# Patient Record
Sex: FEMALE | Race: WHITE | NOT HISPANIC OR LATINO | Employment: OTHER | URBAN - METROPOLITAN AREA
[De-identification: names, ages, dates, MRNs, and addresses within clinical notes are randomized per-mention and may not be internally consistent; named-entity substitution may affect disease eponyms.]

---

## 2017-10-18 ENCOUNTER — ESTABLISHED COMPREHENSIVE EXAM (OUTPATIENT)
Dept: URBAN - METROPOLITAN AREA CLINIC 43 | Facility: CLINIC | Age: 52
End: 2017-10-18

## 2017-10-18 DIAGNOSIS — H52.203: ICD-10-CM

## 2017-10-18 DIAGNOSIS — H52.03: ICD-10-CM

## 2017-10-18 DIAGNOSIS — H40.033: ICD-10-CM

## 2017-10-18 DIAGNOSIS — H25.093: ICD-10-CM

## 2017-10-18 PROCEDURE — 92132 CPTRZD OPH DX IMG ANT SGM: CPT

## 2017-10-18 PROCEDURE — 92310-2 LEVEL 2 CONTACT LENS MANAGEMENT

## 2017-10-18 PROCEDURE — 92004 COMPRE OPH EXAM NEW PT 1/>: CPT

## 2017-10-18 PROCEDURE — 92015 DETERMINE REFRACTIVE STATE: CPT

## 2017-10-18 ASSESSMENT — VISUAL ACUITY
OS_SC: J12
OS_CC: 20/20-1
OS_SC: 20/50
OD_SC: 20/80-1
OD_SC: J14
OS_CC: J1
OD_CC: 20/20-2
OD_CC: J1

## 2017-10-18 ASSESSMENT — TONOMETRY
OD_IOP_MMHG: 18
OS_IOP_MMHG: 15

## 2017-11-01 ENCOUNTER — SURGERY/PROCEDURE (OUTPATIENT)
Dept: URBAN - METROPOLITAN AREA CLINIC 39 | Facility: CLINIC | Age: 52
End: 2017-11-01

## 2017-11-01 ENCOUNTER — CONSULT (OUTPATIENT)
Dept: URBAN - METROPOLITAN AREA SURGERY 14 | Facility: SURGERY | Age: 52
End: 2017-11-01

## 2017-11-01 ENCOUNTER — SURGERY/PROCEDURE (OUTPATIENT)
Dept: URBAN - METROPOLITAN AREA SURGERY 14 | Facility: SURGERY | Age: 52
End: 2017-11-01

## 2017-11-01 VITALS
RESPIRATION RATE: 14 BRPM | SYSTOLIC BLOOD PRESSURE: 136 MMHG | HEIGHT: 55 IN | HEART RATE: 64 BPM | DIASTOLIC BLOOD PRESSURE: 83 MMHG

## 2017-11-01 DIAGNOSIS — H40.033: ICD-10-CM

## 2017-11-01 DIAGNOSIS — H40.031: ICD-10-CM

## 2017-11-01 DIAGNOSIS — H40.032: ICD-10-CM

## 2017-11-01 PROCEDURE — 92014 COMPRE OPH EXAM EST PT 1/>: CPT

## 2017-11-01 PROCEDURE — 66761 REVISION OF IRIS: CPT

## 2017-11-01 ASSESSMENT — VISUAL ACUITY
OS_GLARE: 20/70
OS_CC: J1
OD_CC: 20/25
OD_CC: J1
OD_SC: J12
OD_GLARE: 20/70
OS_SC: 20/50
OS_CC: 20/25
OD_SC: 20/70
OS_SC: J12

## 2017-11-01 ASSESSMENT — TONOMETRY
OS_IOP_MMHG: 16
OD_IOP_MMHG: 15

## 2017-11-08 ENCOUNTER — SURGERY/PROCEDURE (OUTPATIENT)
Dept: URBAN - METROPOLITAN AREA CLINIC 39 | Facility: CLINIC | Age: 52
End: 2017-11-08

## 2017-11-08 DIAGNOSIS — H40.031: ICD-10-CM

## 2017-11-08 PROCEDURE — 66761 REVISION OF IRIS: CPT

## 2017-11-15 ENCOUNTER — POST-OP (OUTPATIENT)
Dept: URBAN - METROPOLITAN AREA CLINIC 43 | Facility: CLINIC | Age: 52
End: 2017-11-15

## 2017-11-15 DIAGNOSIS — H40.031: ICD-10-CM

## 2017-11-15 PROCEDURE — 99024 POSTOP FOLLOW-UP VISIT: CPT

## 2017-11-15 ASSESSMENT — VISUAL ACUITY
OS_CC: J1
OD_CC: 20/20-1
OS_CC: 20/20
OD_CC: J1

## 2017-11-15 ASSESSMENT — TONOMETRY
OS_IOP_MMHG: 17
OD_IOP_MMHG: 16

## 2017-11-29 ENCOUNTER — CONTACT LENS FOLLOW UP (OUTPATIENT)
Dept: URBAN - METROPOLITAN AREA CLINIC 43 | Facility: CLINIC | Age: 52
End: 2017-11-29

## 2017-11-29 DIAGNOSIS — Z97.3: ICD-10-CM

## 2017-11-29 PROCEDURE — 92310F

## 2017-11-29 ASSESSMENT — VISUAL ACUITY
OD_CC: 20/25-2
OS_SC: J12
OU_CC: J1-
OU_CC: 20/30
OD_CC: J10
OU_SC: J12-
OS_CC: J1
OD_SC: 20/70-1
OS_SC: 20/60-2
OU_SC: 20/40-1
OS_CC: 20/200

## 2018-01-09 ENCOUNTER — CONTACT LENS FOLLOW UP (OUTPATIENT)
Dept: URBAN - METROPOLITAN AREA CLINIC 43 | Facility: CLINIC | Age: 53
End: 2018-01-09

## 2018-01-09 DIAGNOSIS — Z97.3: ICD-10-CM

## 2018-01-09 PROCEDURE — 92310F

## 2018-01-09 ASSESSMENT — VISUAL ACUITY
OD_CC: 20/25
OD_CC: J12
OS_CC: J2
OS_CC: 20/400

## 2018-02-27 ENCOUNTER — CONTACT LENS FOLLOW UP (OUTPATIENT)
Dept: URBAN - METROPOLITAN AREA CLINIC 43 | Facility: CLINIC | Age: 53
End: 2018-02-27

## 2018-02-27 DIAGNOSIS — Z97.3: ICD-10-CM

## 2018-02-27 PROCEDURE — 92310F

## 2018-02-27 ASSESSMENT — VISUAL ACUITY
OS_CC: 20/30-2
OD_CC: 20/30-2
OD_CC: J8
OS_CC: J10-

## 2018-04-09 ENCOUNTER — RX CHANGE - NO APPT (OUTPATIENT)
Dept: URBAN - METROPOLITAN AREA CLINIC 43 | Facility: CLINIC | Age: 53
End: 2018-04-09

## 2018-04-09 DIAGNOSIS — Z97.3: ICD-10-CM

## 2018-04-09 PROCEDURE — G8785 BP SCRN NO PERF AT INTERVAL: HCPCS

## 2018-04-09 PROCEDURE — 4040F PNEUMOC VAC/ADMIN/RCVD: CPT

## 2018-04-09 PROCEDURE — G8428 CUR MEDS NOT DOCUMENT: HCPCS

## 2018-04-09 PROCEDURE — 1036F TOBACCO NON-USER: CPT

## 2019-02-06 ENCOUNTER — ESTABLISHED COMPREHENSIVE EXAM (OUTPATIENT)
Dept: URBAN - METROPOLITAN AREA CLINIC 43 | Facility: CLINIC | Age: 54
End: 2019-02-06

## 2019-02-06 DIAGNOSIS — H52.203: ICD-10-CM

## 2019-02-06 DIAGNOSIS — H52.03: ICD-10-CM

## 2019-02-06 PROCEDURE — 92015 DETERMINE REFRACTIVE STATE: CPT

## 2019-02-06 PROCEDURE — 92014 COMPRE OPH EXAM EST PT 1/>: CPT

## 2019-02-06 ASSESSMENT — VISUAL ACUITY
OD_SC: 20/80
OS_CC: J10
OD_CC: J10
OS_SC: 20/80-1
OD_CC: 20/30-1
OS_CC: 20/30+2

## 2019-02-06 ASSESSMENT — TONOMETRY
OD_IOP_MMHG: 16
OS_IOP_MMHG: 15

## 2019-05-29 ENCOUNTER — EST. PATIENT EMERGENCY (OUTPATIENT)
Dept: URBAN - METROPOLITAN AREA CLINIC 43 | Facility: CLINIC | Age: 54
End: 2019-05-29

## 2019-05-29 DIAGNOSIS — H01.005: ICD-10-CM

## 2019-05-29 DIAGNOSIS — H04.123: ICD-10-CM

## 2019-05-29 DIAGNOSIS — H01.002: ICD-10-CM

## 2019-05-29 PROCEDURE — 92012 INTRM OPH EXAM EST PATIENT: CPT

## 2019-05-29 ASSESSMENT — VISUAL ACUITY
OD_SC: 20/70-1
OS_PH: 20/25-1
OS_SC: 20/60-1
OD_PH: 20/30-1

## 2019-10-08 NOTE — PATIENT DISCUSSION
Reviewed OPTIONS including AVASTIN/EYLEA/LUCENTIS and patient wants to proceed with LUCENTIS treatment AND REPEAT EVERY 29 DAYS X 3.

## 2019-10-08 NOTE — PROCEDURE NOTE: CLINICAL
PROCEDURE NOTE: Lucentis 0.5mg PFS OD. Diagnosis: Neovascular AMD with Active CNV. Anesthesia: Lidocaine. Prep: Betadine Flush. Prior to injection, risks/benefits/alternatives discussed including but not limited to infection, loss of vision or eye, hemorrhage, cataract, glaucoma, retinal tears or detachment. The patient wished to proceed with treatment. Topical anesthesia was induced with Alcaine. Additional anesthesia was achieved using drop(s) or injection checked above. A drop of Povidone-iodine 5% ophthalmic solution was instilled over the injection site and in the inferior fornix. Betadine prep was performed. A single use prefilled syringe of intravitreal Lucentis 0.5mg/0.05ml was used and excess discarded. The needle was passed 3.0 mm posterior to the limbus in pseudophakic patients, and 3.5 mm posterior to the limbus in phakic patients. The remainder of the Lucentis 0.5mg in the single-use vial was then discarded in a medical waste disposal container. The eye was irrigated with sterile irrigating solution. Patient tolerated the procedure well. There were no complications. Post procedure instructions given. CF vision checked. Injection Time 4:08PM. The patient was instructed to return for re-evaluation in approximately 4-12 weeks depending on his/her condition and was told to call immediately if vision decreases and/or if his/her eye becomes red, painful, and/or light sensitive. The patient was instructed to go to the emergency room or call 911 if unable to reach the doctor within an hour or two of trying or calling. Trini Dolan

## 2019-10-08 NOTE — PATIENT DISCUSSION
PT UNDERSTANDS [place] PREFERS AVASTIN BUT PT WANTS TO GO WITH FDA APPROVED MED.  CLASS ACTION LAWSUIT ON AVASTIN IS ALSO A CONCER.

## 2019-11-12 NOTE — PROCEDURE NOTE: CLINICAL
PROCEDURE NOTE: Lucentis 0.5mg PFS OD. Diagnosis: Neovascular AMD with Active CNV. Anesthesia: Lidocaine. Prep: Betadine Flush. Prior to injection, risks/benefits/alternatives discussed including but not limited to infection, loss of vision or eye, hemorrhage, cataract, glaucoma, retinal tears or detachment. The patient wished to proceed with treatment. Topical anesthesia was induced with Alcaine. Additional anesthesia was achieved using drop(s) or injection checked above. A drop of Povidone-iodine 5% ophthalmic solution was instilled over the injection site and in the inferior fornix. Betadine prep was performed. A single use prefilled syringe of intravitreal Lucentis 0.5mg/0.05ml was used and excess discarded. The needle was passed 3.0 mm posterior to the limbus in pseudophakic patients, and 3.5 mm posterior to the limbus in phakic patients. The remainder of the Lucentis 0.5mg in the single-use vial was then discarded in a medical waste disposal container. The eye was irrigated with sterile irrigating solution. Patient tolerated the procedure well. There were no complications. Post procedure instructions given. CF vision checked. Injection Time 9:39AM. The patient was instructed to return for re-evaluation in approximately 4-12 weeks depending on his/her condition and was told to call immediately if vision decreases and/or if his/her eye becomes red, painful, and/or light sensitive. The patient was instructed to go to the emergency room or call 911 if unable to reach the doctor within an hour or two of trying or calling. Bety Nance

## 2019-11-12 NOTE — PATIENT DISCUSSION
PT UNDERSTANDS [place] PREFERS AVASTIN BUT PT WANTS TO GO WITH FDA APPROVED MED.  CLASS ACTION LAWSUIT ON AVASTIN IS ALSO A CONCER.

## 2019-12-17 NOTE — PATIENT DISCUSSION
Reviewed OPTIONS including AVASTIN/EYLEA/LUCENTIS and patient wants to proceed with LUCENTIS treatment AND REPEAT EVERY 29 DAYS X 3. Private car

## 2019-12-17 NOTE — PATIENT DISCUSSION
PT UNDERSTANDS [place] PREFERS AVASTIN BUT PT WANTS TO GO WITH FDA APPROVED MED.  CLASS ACTION LAWSUIT ON AVASTIN IS ALSO A CONCER.

## 2019-12-17 NOTE — PROCEDURE NOTE: CLINICAL
PROCEDURE NOTE: Lucentis 0.5mg PFS OD. Diagnosis: Neovascular AMD with Active CNV. Anesthesia: Topical. Prep: Betadine Flush. Prior to injection, risks/benefits/alternatives discussed including but not limited to infection, loss of vision or eye, hemorrhage, cataract, glaucoma, retinal tears or detachment. The patient wished to proceed with treatment. Topical anesthesia was induced with Alcaine. Additional anesthesia was achieved using drop(s) or injection checked above. A drop of Povidone-iodine 5% ophthalmic solution was instilled over the injection site and in the inferior fornix. Betadine prep was performed. A single use prefilled syringe of intravitreal Lucentis 0.5mg/0.05ml was used and excess discarded. The needle was passed 3.0 mm posterior to the limbus in pseudophakic patients, and 3.5 mm posterior to the limbus in phakic patients. The remainder of the Lucentis 0.5mg in the single-use vial was then discarded in a medical waste disposal container. The eye was irrigated with sterile irrigating solution. Patient tolerated the procedure well. There were no complications. Post procedure instructions given. CF vision checked. Injection Time 9:44AM. The patient was instructed to return for re-evaluation in approximately 4-12 weeks depending on his/her condition and was told to call immediately if vision decreases and/or if his/her eye becomes red, painful, and/or light sensitive. The patient was instructed to go to the emergency room or call 911 if unable to reach the doctor within an hour or two of trying or calling. Ni Resendiz

## 2020-01-21 NOTE — PROCEDURE NOTE: CLINICAL

## 2020-01-21 NOTE — PATIENT DISCUSSION
PT UNDERSTANDS [place] PREFERS AVASTIN BUT PT WANTS TO GO WITH FDA APPROVED MED.  CLASS ACTION LAWSUIT ON AVASTIN IS ALSO A CONCER.

## 2020-02-07 ENCOUNTER — ESTABLISHED COMPREHENSIVE EXAM (OUTPATIENT)
Dept: URBAN - METROPOLITAN AREA CLINIC 43 | Facility: CLINIC | Age: 55
End: 2020-02-07

## 2020-02-07 DIAGNOSIS — H52.03: ICD-10-CM

## 2020-02-07 DIAGNOSIS — H52.203: ICD-10-CM

## 2020-02-07 PROCEDURE — 92015 DETERMINE REFRACTIVE STATE: CPT

## 2020-02-07 PROCEDURE — 92014 COMPRE OPH EXAM EST PT 1/>: CPT

## 2020-02-07 ASSESSMENT — TONOMETRY
OS_IOP_MMHG: 16
OD_IOP_MMHG: 16

## 2020-02-07 ASSESSMENT — VISUAL ACUITY
OD_CC: J8-
OD_SC: J14
OD_SC: 20/60-1
OD_CC: 20/20-3
OS_SC: 20/60-1
OS_CC: J8-
OS_SC: J12
OS_CC: 20/20-1

## 2020-02-25 NOTE — PATIENT DISCUSSION
PT UNDERSTANDS [place] PREFERS AVASTIN BUT PT WANTS TO GO WITH FDA APPROVED MED.  CLASS ACTION LAWSUIT ON AVASTIN IS ALSO A CONCER.

## 2020-03-11 NOTE — PATIENT DISCUSSION
PT UNDERSTANDS [place] PREFERS AVASTIN BUT PT WANTS TO GO WITH FDA APPROVED MED.  CLASS ACTION LAWSUIT ON AVASTIN IS ALSO A CONCER.

## 2020-03-11 NOTE — PROCEDURE NOTE: CLINICAL

## 2020-03-16 ENCOUNTER — EST. PATIENT EMERGENCY (OUTPATIENT)
Dept: URBAN - METROPOLITAN AREA CLINIC 43 | Facility: CLINIC | Age: 55
End: 2020-03-16

## 2020-03-16 DIAGNOSIS — H04.121: ICD-10-CM

## 2020-03-16 DIAGNOSIS — H00.022: ICD-10-CM

## 2020-03-16 PROCEDURE — 92012 INTRM OPH EXAM EST PATIENT: CPT

## 2020-03-16 RX ORDER — DOXYCYCLINE 100 MG/1: 1 CAPSULE ORAL ONCE A DAY

## 2020-03-16 ASSESSMENT — TONOMETRY
OS_IOP_MMHG: 16
OD_IOP_MMHG: 16

## 2020-03-16 ASSESSMENT — VISUAL ACUITY
OD_CC: 20/50
OS_CC: 20/25+2

## 2020-04-22 NOTE — PROCEDURE NOTE: CLINICAL
PROCEDURE NOTE: Lucentis 0.5mg PFS OD. Diagnosis: Neovascular AMD with Active CNV. Prior to injection, risks/benefits/alternatives discussed including but not limited to infection, loss of vision or eye, hemorrhage, cataract, glaucoma, retinal tears or detachment. The patient wished to proceed with treatment. Topical anesthesia was induced with Alcaine. Additional anesthesia was achieved using drop(s) or injection checked above. A drop of Povidone-iodine 5% ophthalmic solution was instilled over the injection site and in the inferior fornix. Betadine prep was performed. A single use prefilled syringe of intravitreal Lucentis 0.5mg/0.05ml was used and excess was disposed of as waste. The needle was passed 3.0 mm posterior to the limbus in pseudophakic patients, and 3.5 mm posterior to the limbus in phakic patients. Injection Time 8:45AM. Patient tolerated the procedure well. There were no complications. The eye was irrigated with sterile irrigating solution. Post procedure instructions given. The patient was instructed to use Artificial Tears q.i.d. p.r.n for comfort. The patient was instructed to return for re-evaluation in approximately 4-12 weeks depending on his/her condition and was told to call immediately if vision decreases and/or if his/her eye becomes red, painful, and/or light sensitive. The patient was instructed to go to the emergency room or call 911 if unable to reach the doctor within an hour or two of trying or calling. Bettie Alford

## 2020-04-22 NOTE — PATIENT DISCUSSION
PT UNDERSTANDS [place] PREFERS AVASTIN BUT PT WANTS TO GO WITH FDA APPROVED MED.  CLASS ACTION LAWSUIT ON AVASTIN IS ALSO A CONCER.

## 2020-05-27 NOTE — PATIENT DISCUSSION
5 27 20 MILD ^ SRF AT 5 WKS - TO REDUCE F/U TO 29 DAYS - IF NO IMPROVEMENT CONSIDER SWITCHING TO EYLEA.

## 2020-05-27 NOTE — PATIENT DISCUSSION
PT UNDERSTANDS [place] PREFERS AVASTIN BUT PT WANTS TO GO WITH FDA APPROVED MED.  CLASS ACTION LAWSUIT ON AVASTIN IS ALSO A CONCER.

## 2020-05-27 NOTE — PROCEDURE NOTE: CLINICAL
PROCEDURE NOTE: Lucentis 0.5mg PFS OD. Diagnosis: Neovascular AMD with Active CNV. Prior to injection, risks/benefits/alternatives discussed including but not limited to infection, loss of vision or eye, hemorrhage, cataract, glaucoma, retinal tears or detachment. The patient wished to proceed with treatment. Topical anesthesia was induced with Alcaine. Additional anesthesia was achieved using drop(s) or injection checked above. A drop of Povidone-iodine 5% ophthalmic solution was instilled over the injection site and in the inferior fornix. Betadine prep was performed. A single use prefilled syringe of intravitreal Lucentis 0.5mg/0.05ml was used and excess was disposed of as waste. The needle was passed 3.0 mm posterior to the limbus in pseudophakic patients, and 3.5 mm posterior to the limbus in phakic patients. Injection Time *. Patient tolerated the procedure well. There were no complications. The eye was irrigated with sterile irrigating solution. Post procedure instructions given. The patient was instructed to use Artificial Tears q.i.d. p.r.n for comfort. The patient was instructed to return for re-evaluation in approximately 4-12 weeks depending on his/her condition and was told to call immediately if vision decreases and/or if his/her eye becomes red, painful, and/or light sensitive. The patient was instructed to go to the emergency room or call 911 if unable to reach the doctor within an hour or two of trying or calling. Akbar Salter

## 2020-07-07 NOTE — PATIENT DISCUSSION
PT UNDERSTANDS [place] PREFERS AVASTIN BUT PT WANTS TO GO WITH FDA APPROVED MED.  CLASS ACTION LAWSUIT ON AVASTIN IS ALSO A CONCER.

## 2020-07-07 NOTE — PROCEDURE NOTE: CLINICAL
PROCEDURE NOTE: Eylea PFS OD. Diagnosis: Neovascular AMD with Active CNV. Prior to injection, risks/benefits/alternatives discussed including but not limited to infection, loss of vision or eye, hemorrhage, cataract, glaucoma, retinal tears or detachment. The patient wished to proceed with treatment. Betadine prep was performed. Topical anesthesia was induced with Alcaine. Additional anesthesia was achieved using drop(s) or injection checked above. A drop of Povidone-iodine 5% ophthalmic solution was instilled over the injection site and in the inferior fornix. A single use prefilled syringe of intravitreal Eylea 2mg/0.05ml was used and excess was disposed of as waste. The needle was passed 3.0 mm posterior to the limbus in pseudophakic patients, and 3.5 mm posterior to the limbus in phakic patients. Injection time: 9:39AM.  Patient tolerated procedure well. There were no complications. The eye was irrigated with sterile irrigating solution. Post procedure instructions given. The patient was instructed to use Artificial Tears q.i.d. p.r.n for comfort. The patient was instructed to return for re-evaluation in approximately 4-12 weeks depending on his/her condition and was told to call immediately if vision decreases and/or if his/her eye becomes red, painful, and/or light sensitive. The patient was instructed to go to the emergency room or call 911 if unable to reach the doctor within an hour or two of trying or calling. Cailin Odom

## 2020-08-05 NOTE — PROCEDURE NOTE: CLINICAL

## 2020-08-05 NOTE — PATIENT DISCUSSION
PT UNDERSTANDS [place] PREFERS AVASTIN BUT PT WANTS TO GO WITH FDA APPROVED MED.  CLASS ACTION LAWSUIT ON AVASTIN IS ALSO A CONCER.

## 2020-08-05 NOTE — PATIENT DISCUSSION
EYLEA  AND REPEAT EVERY 29 DAYS X 4 DOI - TRACE SRF/EDEMA AT 29 DAYS - SIG BETTER THAN AT 5 WKS ON L 5 27 20 - RETX AND KEEP 29 DAYS - IF CONTINUES TO DO WELL TO CONSIDER EXTENDING AFTER THIS CYCLE OF TXS.

## 2020-08-11 ENCOUNTER — EST. PATIENT EMERGENCY (OUTPATIENT)
Dept: URBAN - METROPOLITAN AREA CLINIC 43 | Facility: CLINIC | Age: 55
End: 2020-08-11

## 2020-08-11 DIAGNOSIS — H00.022: ICD-10-CM

## 2020-08-11 DIAGNOSIS — H04.121: ICD-10-CM

## 2020-08-11 PROCEDURE — 92012 INTRM OPH EXAM EST PATIENT: CPT

## 2020-08-11 RX ORDER — DOXYCYCLINE 50 MG/1
1 CAPSULE ORAL ONCE A DAY
Start: 2020-08-11 | End: 2020-09-11

## 2020-08-11 ASSESSMENT — VISUAL ACUITY
OD_CC: 20/25
OS_CC: 20/25

## 2020-09-03 NOTE — PROCEDURE NOTE: CLINICAL

## 2020-09-03 NOTE — PATIENT DISCUSSION
PT UNDERSTANDS [place] PREFERS AVASTIN BUT PT WANTS TO GO WITH FDA APPROVED MED.  CLASS ACTION LAWSUIT ON AVASTIN IS ALSO A CONCER.

## 2020-10-05 NOTE — PROCEDURE NOTE: CLINICAL
PROCEDURE NOTE: Eylea PFS OD. Diagnosis: Neovascular AMD with Active CNV. Prior to injection, risks/benefits/alternatives discussed including but not limited to infection, loss of vision or eye, hemorrhage, cataract, glaucoma, retinal tears or detachment. The patient wished to proceed with treatment. Betadine prep was performed. Topical anesthesia was induced with Alcaine. Additional anesthesia was achieved using drop(s) or injection checked above. A drop of Povidone-iodine 5% ophthalmic solution was instilled over the injection site and in the inferior fornix. A single use prefilled syringe of intravitreal Eylea 2mg/0.05ml was used and excess was disposed of as waste. The needle was passed 3.0 mm posterior to the limbus in pseudophakic patients, and 3.5 mm posterior to the limbus in phakic patients. Injection time: 3:00 P. M. Patient tolerated procedure well. There were no complications. The eye was irrigated with sterile irrigating solution. Post procedure instructions given. The patient was instructed to use Artificial Tears q.i.d. p.r.n for comfort. The patient was instructed to return for re-evaluation in approximately 4-12 weeks depending on his/her condition and was told to call immediately if vision decreases and/or if his/her eye becomes red, painful, and/or light sensitive. The patient was instructed to go to the emergency room or call 911 if unable to reach the doctor within an hour or two of trying or calling. Dylon Marinelli

## 2020-10-05 NOTE — PATIENT DISCUSSION
8 5 20 EYLEA  AND REPEAT EVERY 29 DAYS X 4 DOI - TRACE SRF/EDEMA AT 29 DAYS - SIG BETTER THAN AT 5 WKS ON L 5 27 20 - RETX AND KEEP 29 DAYS - IF CONTINUES TO DO WELL TO CONSIDER EXTENDING AFTER THIS CYCLE OF TXS.

## 2020-10-05 NOTE — PATIENT DISCUSSION
PT UNDERSTANDS [place] PREFERS AVASTIN BUT PT WANTS TO GO WITH FDA APPROVED MED.  CLASS ACTION LAWSUIT ON AVASTIN IS ALSO A CONCER.

## 2020-10-21 ENCOUNTER — EST. PATIENT EMERGENCY (OUTPATIENT)
Dept: URBAN - METROPOLITAN AREA CLINIC 43 | Facility: CLINIC | Age: 55
End: 2020-10-21

## 2020-10-21 DIAGNOSIS — H00.15: ICD-10-CM

## 2020-10-21 DIAGNOSIS — H00.12: ICD-10-CM

## 2020-10-21 DIAGNOSIS — L98.0: ICD-10-CM

## 2020-10-21 DIAGNOSIS — H04.121: ICD-10-CM

## 2020-10-21 PROCEDURE — 99212 OFFICE O/P EST SF 10 MIN: CPT

## 2020-10-21 ASSESSMENT — VISUAL ACUITY
OS_CC: 20/30+2
OD_CC: 20/30-1

## 2020-10-26 ENCOUNTER — ESTABLISHED PATIENT (OUTPATIENT)
Dept: URBAN - METROPOLITAN AREA CLINIC 44 | Facility: CLINIC | Age: 55
End: 2020-10-26

## 2020-10-26 DIAGNOSIS — H00.12: ICD-10-CM

## 2020-10-26 DIAGNOSIS — H00.15: ICD-10-CM

## 2020-10-26 DIAGNOSIS — L98.0: ICD-10-CM

## 2020-10-26 PROCEDURE — 92285 EXTERNAL OCULAR PHOTOGRAPHY: CPT

## 2020-10-26 PROCEDURE — 67801 REMOVE EYELID LESIONS: CPT

## 2020-10-26 PROCEDURE — 99212 OFFICE O/P EST SF 10 MIN: CPT

## 2020-10-26 RX ORDER — TOBRAMYCIN AND DEXAMETHASONE 1; 3 MG/ML; MG/ML
1 SUSPENSION/ DROPS OPHTHALMIC
End: 2020-11-09

## 2020-10-26 ASSESSMENT — VISUAL ACUITY
OS_CC: 20/30+2
OD_CC: 20/30-1

## 2020-11-10 NOTE — PATIENT DISCUSSION
PT UNDERSTANDS [place] PREFERS AVASTIN BUT PT WANTS TO GO WITH FDA APPROVED MED.  CLASS ACTION LAWSUIT ON AVASTIN IS ALSO A CONCER.

## 2020-11-10 NOTE — PROCEDURE NOTE: CLINICAL

## 2020-12-15 NOTE — PROCEDURE NOTE: CLINICAL
PROCEDURE NOTE: Eylea PFS OD. Diagnosis: Neovascular AMD with Active CNV. Anesthesia: Topical. Prep: Betadine Drops. Prior to injection, risks/benefits/alternatives discussed including but not limited to infection, loss of vision or eye, hemorrhage, cataract, glaucoma, retinal tears or detachment. The patient wished to proceed with treatment. Betadine prep was performed. Topical anesthesia was induced with Alcaine. Additional anesthesia was achieved using drop(s) or injection checked above. A drop of Povidone-iodine 5% ophthalmic solution was instilled over the injection site and in the inferior fornix. A single use prefilled syringe of intravitreal Eylea 2mg/0.05ml was used and excess was disposed of as waste. The needle was passed 3.0 mm posterior to the limbus in pseudophakic patients, and 3.5 mm posterior to the limbus in phakic patients. Injection time: 1000AM.  Patient tolerated procedure well. There were no complications. The eye was irrigated with sterile irrigating solution. Post procedure instructions given. The patient was instructed to use Artificial Tears q.i.d. p.r.n for comfort. The patient was instructed to return for re-evaluation in approximately 4-12 weeks depending on his/her condition and was told to call immediately if vision decreases and/or if his/her eye becomes red, painful, and/or light sensitive. The patient was instructed to go to the emergency room or call 911 if unable to reach the doctor within an hour or two of trying or calling. Reid Rowell

## 2020-12-15 NOTE — PATIENT DISCUSSION
PT UNDERSTANDS [place] PREFERS AVASTIN BUT PT WANTS TO GO WITH FDA APPROVED MED.  CLASS ACTION LAWSUIT ON AVASTIN IS ALSO A CONCER.

## 2021-01-20 NOTE — PATIENT DISCUSSION
PT UNDERSTANDS [place] PREFERS AVASTIN BUT PT WANTS TO GO WITH FDA APPROVED MED.  CLASS ACTION LAWSUIT ON AVASTIN IS ALSO A CONCER.

## 2021-01-20 NOTE — PATIENT DISCUSSION
1 20 21 TO EXTEND TO 6 WKS AND REPEAT EVERY 6 WKS X 3 DOI- TRACE EDEMA AT 5 WKS ON EYLEA - TX AND EXTEND.

## 2021-01-20 NOTE — PROCEDURE NOTE: CLINICAL
PROCEDURE NOTE: Eylea PFS OD. Diagnosis: Neovascular AMD with Active CNV. Anesthesia: Topical. Prep: Betadine Drops. Prior to injection, risks/benefits/alternatives discussed including but not limited to infection, loss of vision or eye, hemorrhage, cataract, glaucoma, retinal tears or detachment. The patient wished to proceed with treatment. Betadine prep was performed. Topical anesthesia was induced with Alcaine. Additional anesthesia was achieved using drop(s) or injection checked above. A drop of Povidone-iodine 5% ophthalmic solution was instilled over the injection site and in the inferior fornix. A single use prefilled syringe of intravitreal Eylea 2mg/0.05ml was used and excess was disposed of as waste. The needle was passed 3.0 mm posterior to the limbus in pseudophakic patients, and 3.5 mm posterior to the limbus in phakic patients. Injection time: 10:00 a.m. Patient tolerated procedure well. There were no complications. The eye was irrigated with sterile irrigating solution. Post procedure instructions given. The patient was instructed to use Artificial Tears q.i.d. p.r.n for comfort. The patient was instructed to return for re-evaluation in approximately 4-12 weeks depending on his/her condition and was told to call immediately if vision decreases and/or if his/her eye becomes red, painful, and/or light sensitive. The patient was instructed to go to the emergency room or call 911 if unable to reach the doctor within an hour or two of trying or calling. Nadira Ann

## 2021-02-10 ENCOUNTER — ESTABLISHED COMPREHENSIVE EXAM (OUTPATIENT)
Dept: URBAN - METROPOLITAN AREA CLINIC 43 | Facility: CLINIC | Age: 56
End: 2021-02-10

## 2021-02-10 DIAGNOSIS — H00.12: ICD-10-CM

## 2021-02-10 DIAGNOSIS — H00.15: ICD-10-CM

## 2021-02-10 DIAGNOSIS — H52.03: ICD-10-CM

## 2021-02-10 DIAGNOSIS — H40.031: ICD-10-CM

## 2021-02-10 DIAGNOSIS — H04.121: ICD-10-CM

## 2021-02-10 PROCEDURE — 92015 DETERMINE REFRACTIVE STATE: CPT

## 2021-02-10 PROCEDURE — 92310-2 LEVEL 2 CONTACT LENS MANAGEMENT

## 2021-02-10 PROCEDURE — 92014 COMPRE OPH EXAM EST PT 1/>: CPT

## 2021-02-10 ASSESSMENT — TONOMETRY
OD_IOP_MMHG: 16
OS_IOP_MMHG: 16

## 2021-02-10 ASSESSMENT — VISUAL ACUITY
OD_CC: J1
OD_SC: J12
OS_CC: 20/30+2
OS_CC: J1
OD_CC: 20/20-2
OS_SC: J12
OS_SC: 20/60-1
OD_SC: 20/40-2

## 2021-02-19 ENCOUNTER — CONTACT LENS FOLLOW UP (OUTPATIENT)
Dept: URBAN - METROPOLITAN AREA CLINIC 43 | Facility: CLINIC | Age: 56
End: 2021-02-19

## 2021-02-19 DIAGNOSIS — Z97.3: ICD-10-CM

## 2021-02-19 DIAGNOSIS — H52.03: ICD-10-CM

## 2021-02-19 PROCEDURE — 92310F

## 2021-02-19 ASSESSMENT — VISUAL ACUITY
OD_CC: J4
OS_CC: J12
OD_CC: 20/40
OS_SC: J12
OS_CC: 20/40
OS_SC: 20/80
OD_SC: J12
OD_SC: 20/70

## 2021-02-23 ENCOUNTER — ESTABLISHED PATIENT (OUTPATIENT)
Dept: URBAN - METROPOLITAN AREA CLINIC 44 | Facility: CLINIC | Age: 56
End: 2021-02-23

## 2021-02-23 DIAGNOSIS — H00.12: ICD-10-CM

## 2021-02-23 DIAGNOSIS — H00.15: ICD-10-CM

## 2021-02-23 PROCEDURE — 67800 REMOVE EYELID LESION: CPT

## 2021-02-23 PROCEDURE — 92285 EXTERNAL OCULAR PHOTOGRAPHY: CPT

## 2021-02-23 PROCEDURE — 99212 OFFICE O/P EST SF 10 MIN: CPT

## 2021-02-23 RX ORDER — NEOMYCIN SULFATE, POLYMYXIN B SULFATE AND DEXAMETHASONE 3.5; 10000; 1 MG/G; [USP'U]/G; MG/G
OINTMENT OPHTHALMIC EVERY EVENING
End: 2021-03-09

## 2021-02-23 RX ORDER — NEOMYCIN SULFATE, POLYMYXIN B SULFATE AND DEXAMETHASONE 3.5; 10000; 1 MG/G; [USP'U]/G; MG/G: OINTMENT OPHTHALMIC TWICE A DAY

## 2021-02-23 ASSESSMENT — VISUAL ACUITY
OD_CC: 20/40
OS_CC: 20/40

## 2021-03-03 NOTE — PROCEDURE NOTE: CLINICAL

## 2021-03-03 NOTE — PATIENT DISCUSSION
PT UNDERSTANDS [place] PREFERS AVASTIN BUT PT WANTS TO GO WITH FDA APPROVED MED.  CLASS ACTION LAWSUIT ON AVASTIN IS ALSO A CONCER.

## 2021-03-24 ENCOUNTER — CONTACT LENS FOLLOW UP (OUTPATIENT)
Dept: URBAN - METROPOLITAN AREA CLINIC 43 | Facility: CLINIC | Age: 56
End: 2021-03-24

## 2021-03-24 DIAGNOSIS — H52.203: ICD-10-CM

## 2021-03-24 DIAGNOSIS — H04.121: ICD-10-CM

## 2021-03-24 DIAGNOSIS — Z97.3: ICD-10-CM

## 2021-03-24 DIAGNOSIS — H52.03: ICD-10-CM

## 2021-03-24 PROCEDURE — 92310F

## 2021-04-12 NOTE — PROCEDURE NOTE: CLINICAL

## 2021-04-12 NOTE — PATIENT DISCUSSION
PT UNDERSTANDS [place] PREFERS AVASTIN BUT PT WANTS TO GO WITH FDA APPROVED MED.  CLASS ACTION LAWSUIT ON AVASTIN IS ALSO A CONCER.

## 2021-05-17 NOTE — PATIENT DISCUSSION
PT UNDERSTANDS [place] PREFERS AVASTIN BUT PT WANTS TO GO WITH FDA APPROVED MED.  CLASS ACTION LAWSUIT ON AVASTIN IS ALSO A CONCER.

## 2021-05-17 NOTE — PROCEDURE NOTE: CLINICAL
PROCEDURE NOTE: Eylea PFS OD. Diagnosis: Neovascular AMD with Active CNV. Anesthesia: Topical. Prep: Betadine Drops. Prior to injection, risks/benefits/alternatives discussed including but not limited to infection, loss of vision or eye, hemorrhage, cataract, glaucoma, retinal tears or detachment. The patient wished to proceed with treatment. Betadine prep was performed. Topical anesthesia was induced with Alcaine. Additional anesthesia was achieved using drop(s) or injection checked above. A drop of Povidone-iodine 5% ophthalmic solution was instilled over the injection site and in the inferior fornix. A single use prefilled syringe of intravitreal Eylea 2mg/0.05ml was used and excess was disposed of as waste. The needle was passed 3.0 mm posterior to the limbus in pseudophakic patients, and 3.5 mm posterior to the limbus in phakic patients. Injection time: 330. Patient tolerated procedure well. There were no complications. The eye was irrigated with sterile irrigating solution. Post procedure instructions given. The patient was instructed to use Artificial Tears q.i.d. p.r.n for comfort. The patient was instructed to return for re-evaluation in approximately 4-12 weeks depending on his/her condition and was told to call immediately if vision decreases and/or if his/her eye becomes red, painful, and/or light sensitive. The patient was instructed to go to the emergency room or call 911 if unable to reach the doctor within an hour or two of trying or calling. Nataly Navarrete

## 2021-06-21 NOTE — PATIENT DISCUSSION
PT UNDERSTANDS [place] PREFERS AVASTIN BUT PT WANTS TO GO WITH FDA APPROVED MED.  CLASS ACTION LAWSUIT ON AVASTIN IS ALSO A CONCER.

## 2021-06-21 NOTE — PROCEDURE NOTE: CLINICAL

## 2021-06-21 NOTE — PATIENT DISCUSSION
5 17 21 EYLEA AND HARVEY T IN 5 WKS DOI - TRACE EDEMA AT 5 WKS - DOING BETTER - RETX AND KEEP 5 WKS.

## 2021-11-17 NOTE — PROCEDURE NOTE: CLINICAL

## 2021-11-17 NOTE — PATIENT DISCUSSION
PT UNDERSTANDS [place] PREFERS AVASTIN BUT PT WANTS TO GO WITH FDA APPROVED MED.  CLASS ACTION LAWSUIT ON AVASTIN IS ALSO A CONCER.

## 2021-12-16 NOTE — PATIENT DISCUSSION
PT UNDERSTANDS [place] PREFERS AVASTIN BUT PT WANTS TO GO WITH FDA APPROVED MED.  CLASS ACTION LAWSUIT ON AVASTIN IS ALSO A CONCER.

## 2021-12-16 NOTE — PROCEDURE NOTE: CLINICAL

## 2022-01-10 ENCOUNTER — ESTABLISHED PATIENT (OUTPATIENT)
Dept: URBAN - METROPOLITAN AREA CLINIC 44 | Facility: CLINIC | Age: 57
End: 2022-01-10

## 2022-01-10 DIAGNOSIS — H00.15: ICD-10-CM

## 2022-01-10 DIAGNOSIS — H00.12: ICD-10-CM

## 2022-01-10 PROCEDURE — 67800 REMOVE EYELID LESION: CPT

## 2022-01-10 PROCEDURE — 92285 EXTERNAL OCULAR PHOTOGRAPHY: CPT

## 2022-01-10 PROCEDURE — 99213 OFFICE O/P EST LOW 20 MIN: CPT

## 2022-01-10 RX ORDER — ERYTHROMYCIN 5 MG/G
OINTMENT OPHTHALMIC EVERY EVENING
Start: 2022-01-10 | End: 2022-01-24

## 2022-01-10 RX ORDER — NEOMYCIN SULFATE, POLYMYXIN B SULFATE AND DEXAMETHASONE 3.5; 10000; 1 MG/ML; [USP'U]/ML; MG/ML
1 SUSPENSION OPHTHALMIC
Start: 2022-01-10 | End: 2022-01-24

## 2022-01-10 ASSESSMENT — VISUAL ACUITY
OD_SC: 20/30
OS_SC: 20/30

## 2022-01-17 NOTE — PATIENT DISCUSSION
PT UNDERSTANDS [place] PREFERS AVASTIN BUT PT WANTS TO GO WITH FDA APPROVED MED.  CLASS ACTION LAWSUIT ON AVASTIN IS ALSO A CONCER.

## 2022-01-17 NOTE — PROCEDURE NOTE: CLINICAL

## 2022-02-15 NOTE — PROCEDURE NOTE: CLINICAL

## 2022-02-15 NOTE — PATIENT DISCUSSION
PT UNDERSTANDS [place] PREFERS AVASTIN BUT PT WANTS TO GO WITH FDA APPROVED MED.  CLASS ACTION LAWSUIT ON AVASTIN IS ALSO A CONCER.

## 2022-02-16 ENCOUNTER — COMPREHENSIVE EXAM (OUTPATIENT)
Dept: URBAN - METROPOLITAN AREA CLINIC 43 | Facility: CLINIC | Age: 57
End: 2022-02-16

## 2022-02-16 DIAGNOSIS — H52.203: ICD-10-CM

## 2022-02-16 DIAGNOSIS — H52.03: ICD-10-CM

## 2022-02-16 DIAGNOSIS — Z97.3: ICD-10-CM

## 2022-02-16 DIAGNOSIS — Z01.00: ICD-10-CM

## 2022-02-16 PROCEDURE — 92015 DETERMINE REFRACTIVE STATE: CPT

## 2022-02-16 PROCEDURE — 92014 COMPRE OPH EXAM EST PT 1/>: CPT

## 2022-02-16 ASSESSMENT — VISUAL ACUITY
OS_CC: J6-
OD_CC: J8-
OD_SC: J12
OD_CC: 20/20-2
OS_CC: 20/20-3
OS_SC: J12
OD_SC: 20/70-2
OS_SC: 20/70-1

## 2022-02-16 ASSESSMENT — TONOMETRY
OS_IOP_MMHG: 17
OD_IOP_MMHG: 17

## 2022-04-19 NOTE — PROCEDURE NOTE: CLINICAL

## 2022-04-19 NOTE — PATIENT DISCUSSION
5 27 20 MILD ^ SRF AT 5 WKS - TO REDUCE F/U TO 29 DAYS - IF NO IMPROVEMENT CONSIDER SWITCHING TO EYLEA. Pt was very drowsy and was unable to keep her eyes open. Pt was amendable to prayer which the  provided. Spiritual care to continue, assess tp for spiritual needs once condition improves.      08/03/21 3472   Encounter Summary   Services provided to: Patient   Referral/Consult From: Rounding   Support System Unknown   Continue Visiting Yes  (8/3)   Complexity of Encounter Low   Length of Encounter 15 minutes   Spiritual/Worship   Type Spiritual support   Assessment Passive   Intervention Prayer   Outcome Comfort

## 2022-04-19 NOTE — PATIENT DISCUSSION
4 19 22 MILD SRF @ 9 WKS - INCREASED - HAD COLD AND DID NOT COME IN - EYLEA AND RESUME WITH 5-6 WKS AS HE IS GOING TO BE OUT THE MONTH OF JUNE TO MAKE HIS SCHEDULE WORK BEST FOR HIS PLANS.

## 2022-04-19 NOTE — PATIENT DISCUSSION
PT UNDERSTANDS [place] PREFERS AVASTIN BUT PT WANTS TO GO WITH FDA APPROVED MED.  CLASS ACTION LAWSUIT ON AVASTIN IS ALSO A CONCER.

## 2022-05-31 NOTE — PROCEDURE NOTE: CLINICAL

## 2022-05-31 NOTE — PATIENT DISCUSSION
PT UNDERSTANDS [place] PREFERS AVASTIN BUT PT WANTS TO GO WITH FDA APPROVED MED.  CLASS ACTION LAWSUIT ON AVASTIN IS ALSO A CONCER.

## 2022-07-19 NOTE — PROCEDURE NOTE: CLINICAL

## 2022-07-19 NOTE — PATIENT DISCUSSION
PT UNDERSTANDS [place] PREFERS AVASTIN BUT PT WANTS TO GO WITH FDA APPROVED MED.  CLASS ACTION LAWSUIT ON AVASTIN IS ALSO A CONCER.

## 2022-09-07 NOTE — PATIENT DISCUSSION
PT UNDERSTANDS [place] PREFERS AVASTIN BUT PT WANTS TO GO WITH FDA APPROVED MED.  CLASS ACTION LAWSUIT ON AVASTIN IS ALSO A CONCER.

## 2022-09-07 NOTE — PROCEDURE NOTE: CLINICAL

## 2022-10-26 NOTE — PATIENT DISCUSSION
PT UNDERSTANDS [place] PREFERS AVASTIN BUT PT WANTS TO GO WITH FDA APPROVED MED.  CLASS ACTION LAWSUIT ON AVASTIN IS ALSO A CONCER.

## 2022-10-26 NOTE — PROCEDURE NOTE: CLINICAL
PROCEDURE NOTE: Eylea PFS OD. Diagnosis: Neovascular AMD with Active CNV. Anesthesia: Topical. Prep: Betadine Drops. Prior to injection, risks/benefits/alternatives discussed including but not limited to infection, loss of vision or eye, hemorrhage, cataract, glaucoma, retinal tears or detachment. The patient wished to proceed with treatment. Betadine prep was performed. Topical anesthesia was induced with Alcaine. Additional anesthesia was achieved using drop(s) or injection checked above. A drop of Povidone-iodine 5% ophthalmic solution was instilled over the injection site and in the inferior fornix. A single use prefilled syringe of intravitreal Eylea 2mg/0.05ml was used and excess was disposed of as waste. The needle was passed 3.0 mm posterior to the limbus in pseudophakic patients, and 3.5 mm posterior to the limbus in phakic patients. Injection time: 9:30AM.  Patient tolerated procedure well. There were no complications. The eye was irrigated with sterile irrigating solution. Post procedure instructions given. The patient was instructed to use Artificial Tears q.i.d. p.r.n for comfort. The patient was instructed to return for re-evaluation in approximately 4-12 weeks depending on his/her condition and was told to call immediately if vision decreases and/or if his/her eye becomes red, painful, and/or light sensitive. The patient was instructed to go to the emergency room or call 911 if unable to reach the doctor within an hour or two of trying or calling. Fidencio Garcia

## 2022-12-14 NOTE — PROCEDURE NOTE: CLINICAL

## 2022-12-14 NOTE — PATIENT DISCUSSION
PT UNDERSTANDS [place] PREFERS AVASTIN BUT PT WANTS TO GO WITH FDA APPROVED MED.  [employer] LAW[employer]IT ON AVASTIN IS ALSO A CONCER.

## 2023-01-25 NOTE — PROCEDURE NOTE: CLINICAL

## 2024-03-05 ENCOUNTER — COMPREHENSIVE EXAM (OUTPATIENT)
Dept: URBAN - METROPOLITAN AREA CLINIC 43 | Facility: CLINIC | Age: 59
End: 2024-03-05

## 2024-03-05 DIAGNOSIS — H52.203: ICD-10-CM

## 2024-03-05 DIAGNOSIS — Z97.3: ICD-10-CM

## 2024-03-05 DIAGNOSIS — H52.03: ICD-10-CM

## 2024-03-05 DIAGNOSIS — Z01.00: ICD-10-CM

## 2024-03-05 PROCEDURE — 92015 DETERMINE REFRACTIVE STATE: CPT

## 2024-03-05 PROCEDURE — 92014 COMPRE OPH EXAM EST PT 1/>: CPT

## 2024-03-05 RX ORDER — BIMATOPROST 0.3 MG/ML: 1 SOLUTION/ DROPS OPHTHALMIC ONCE A DAY

## 2024-03-05 ASSESSMENT — VISUAL ACUITY
OS_SC: 20/50-2
OS_SC: J12
OS_CC: 20/25+2
OD_CC: 20/20
OD_CC: J10
OS_CC: J6
OD_SC: 20/50-1
OD_SC: J14

## 2024-03-05 ASSESSMENT — TONOMETRY
OS_IOP_MMHG: 19
OD_IOP_MMHG: 16

## 2025-03-11 ENCOUNTER — COMPREHENSIVE EXAM (OUTPATIENT)
Age: 60
End: 2025-03-11

## 2025-03-11 DIAGNOSIS — H40.023: ICD-10-CM

## 2025-03-11 DIAGNOSIS — H40.031: ICD-10-CM

## 2025-03-11 DIAGNOSIS — H04.121: ICD-10-CM

## 2025-03-11 DIAGNOSIS — Z98.890: ICD-10-CM

## 2025-03-11 DIAGNOSIS — H25.093: ICD-10-CM

## 2025-03-11 DIAGNOSIS — H52.203: ICD-10-CM

## 2025-03-11 DIAGNOSIS — H52.03: ICD-10-CM

## 2025-03-11 PROCEDURE — 92014 COMPRE OPH EXAM EST PT 1/>: CPT | Mod: 25

## 2025-03-11 PROCEDURE — 92015 DETERMINE REFRACTIVE STATE: CPT

## 2025-03-11 PROCEDURE — 92083 EXTENDED VISUAL FIELD XM: CPT
